# Patient Record
Sex: FEMALE | NOT HISPANIC OR LATINO | URBAN - METROPOLITAN AREA
[De-identification: names, ages, dates, MRNs, and addresses within clinical notes are randomized per-mention and may not be internally consistent; named-entity substitution may affect disease eponyms.]

---

## 2018-07-11 ENCOUNTER — OFFICE VISIT (OUTPATIENT)
Dept: URBAN - METROPOLITAN AREA CLINIC 76 | Facility: CLINIC | Age: 43
End: 2018-07-11

## 2018-07-11 DIAGNOSIS — H40.013 OPEN ANGLE WITH BORDERLINE FINDINGS, LOW RISK, BILATERAL: ICD-10-CM

## 2018-07-11 DIAGNOSIS — H52.13 MYOPIA, BILATERAL: Primary | ICD-10-CM

## 2018-07-11 PROCEDURE — 92004 COMPRE OPH EXAM NEW PT 1/>: CPT | Performed by: OPTOMETRIST

## 2018-07-11 ASSESSMENT — KERATOMETRY
OS: 42.63
OD: 42.38

## 2018-07-11 ASSESSMENT — INTRAOCULAR PRESSURE
OD: 15
OS: 17

## 2018-07-11 ASSESSMENT — VISUAL ACUITY
OD: 20/20
OS: 20/20

## 2018-07-11 NOTE — IMPRESSION/PLAN
Impression: Open angle with borderline findings, low risk, bilateral: H40.013. based on c/d's and fm h/x. Plan: Discussed condition. Needs updated testing- pt is self pay. Pt states she has free healthcare in [facility]. Pt states she is returning to [facility] shortly. Recommend get updated glc testing while in [facility] and bring back copy of records.

## 2018-07-11 NOTE — IMPRESSION/PLAN
Impression: Myopia, bilateral: H52.13. Plan: Discussed diagnosis with patient. No glasses needed at this time.